# Patient Record
Sex: MALE | Race: WHITE | Employment: FULL TIME | URBAN - METROPOLITAN AREA
[De-identification: names, ages, dates, MRNs, and addresses within clinical notes are randomized per-mention and may not be internally consistent; named-entity substitution may affect disease eponyms.]

---

## 2022-08-25 NOTE — PROGRESS NOTES
Wear loose, comfortable clothing that is easy to put on and take off. They will remain in post-op with the nurse. If you will be returning home the same day as your surgery, you will need to have a responsible adult (25years of age or older) present to drive you home. You will need someone stay with you at home for the first 24 hours following your surgery. This is due to the anesthesia and the medication given to you during surgery and recovery.

## 2022-09-05 ENCOUNTER — ANESTHESIA EVENT (OUTPATIENT)
Dept: OPERATING ROOM | Age: 37
End: 2022-09-05

## 2022-09-08 ENCOUNTER — ANESTHESIA (OUTPATIENT)
Dept: OPERATING ROOM | Age: 37
End: 2022-09-08

## 2022-09-08 ENCOUNTER — HOSPITAL ENCOUNTER (OUTPATIENT)
Age: 37
Setting detail: OUTPATIENT SURGERY
Discharge: HOME | End: 2022-09-08
Attending: PLASTIC SURGERY | Admitting: PLASTIC SURGERY

## 2022-09-08 VITALS
WEIGHT: 232 LBS | RESPIRATION RATE: 17 BRPM | HEIGHT: 68 IN | OXYGEN SATURATION: 98 % | DIASTOLIC BLOOD PRESSURE: 82 MMHG | BODY MASS INDEX: 35.16 KG/M2 | HEART RATE: 77 BPM | SYSTOLIC BLOOD PRESSURE: 132 MMHG | TEMPERATURE: 96.7 F

## 2022-09-08 DIAGNOSIS — G89.18 POST-OP PAIN: Primary | ICD-10-CM

## 2022-09-08 PROCEDURE — 6360000002 HC RX W HCPCS: Performed by: PLASTIC SURGERY

## 2022-09-08 PROCEDURE — 6370000000 HC RX 637 (ALT 250 FOR IP)

## 2022-09-08 PROCEDURE — 2500000003 HC RX 250 WO HCPCS: Performed by: PLASTIC SURGERY

## 2022-09-08 PROCEDURE — 7100000001 HC PACU RECOVERY - ADDTL 15 MIN: Performed by: PLASTIC SURGERY

## 2022-09-08 PROCEDURE — 3600000012 HC SURGERY LEVEL 2 ADDTL 15MIN: Performed by: PLASTIC SURGERY

## 2022-09-08 PROCEDURE — 3600000002 HC SURGERY LEVEL 2 BASE: Performed by: PLASTIC SURGERY

## 2022-09-08 PROCEDURE — 6360000002 HC RX W HCPCS: Performed by: NURSE ANESTHETIST, CERTIFIED REGISTERED

## 2022-09-08 PROCEDURE — 7100000000 HC PACU RECOVERY - FIRST 15 MIN: Performed by: PLASTIC SURGERY

## 2022-09-08 PROCEDURE — 6360000002 HC RX W HCPCS

## 2022-09-08 PROCEDURE — 3700000000 HC ANESTHESIA ATTENDED CARE: Performed by: PLASTIC SURGERY

## 2022-09-08 PROCEDURE — 2500000003 HC RX 250 WO HCPCS: Performed by: NURSE ANESTHETIST, CERTIFIED REGISTERED

## 2022-09-08 PROCEDURE — 3700000001 HC ADD 15 MINUTES (ANESTHESIA): Performed by: PLASTIC SURGERY

## 2022-09-08 PROCEDURE — 2580000003 HC RX 258: Performed by: ANESTHESIOLOGY

## 2022-09-08 PROCEDURE — 2580000003 HC RX 258: Performed by: PLASTIC SURGERY

## 2022-09-08 PROCEDURE — 7100000011 HC PHASE II RECOVERY - ADDTL 15 MIN: Performed by: PLASTIC SURGERY

## 2022-09-08 PROCEDURE — 7100000010 HC PHASE II RECOVERY - FIRST 15 MIN: Performed by: PLASTIC SURGERY

## 2022-09-08 PROCEDURE — 2709999900 HC NON-CHARGEABLE SUPPLY: Performed by: PLASTIC SURGERY

## 2022-09-08 RX ORDER — BUPIVACAINE HYDROCHLORIDE 2.5 MG/ML
INJECTION, SOLUTION INFILTRATION; PERINEURAL
Status: DISCONTINUED
Start: 2022-09-08 | End: 2022-09-08 | Stop reason: HOSPADM

## 2022-09-08 RX ORDER — OXYCODONE HYDROCHLORIDE AND ACETAMINOPHEN 5; 325 MG/1; MG/1
1 TABLET ORAL ONCE
Status: COMPLETED | OUTPATIENT
Start: 2022-09-08 | End: 2022-09-08

## 2022-09-08 RX ORDER — SODIUM CHLORIDE 0.9 % (FLUSH) 0.9 %
5-40 SYRINGE (ML) INJECTION PRN
Status: DISCONTINUED | OUTPATIENT
Start: 2022-09-08 | End: 2022-09-08 | Stop reason: HOSPADM

## 2022-09-08 RX ORDER — ONDANSETRON 2 MG/ML
INJECTION INTRAMUSCULAR; INTRAVENOUS PRN
Status: DISCONTINUED | OUTPATIENT
Start: 2022-09-08 | End: 2022-09-08 | Stop reason: SDUPTHER

## 2022-09-08 RX ORDER — LIDOCAINE HYDROCHLORIDE 10 MG/ML
INJECTION, SOLUTION INFILTRATION; PERINEURAL PRN
Status: DISCONTINUED | OUTPATIENT
Start: 2022-09-08 | End: 2022-09-08 | Stop reason: SDUPTHER

## 2022-09-08 RX ORDER — GLYCOPYRROLATE 0.2 MG/ML
INJECTION INTRAMUSCULAR; INTRAVENOUS PRN
Status: DISCONTINUED | OUTPATIENT
Start: 2022-09-08 | End: 2022-09-08 | Stop reason: SDUPTHER

## 2022-09-08 RX ORDER — ROCURONIUM BROMIDE 10 MG/ML
INJECTION, SOLUTION INTRAVENOUS PRN
Status: DISCONTINUED | OUTPATIENT
Start: 2022-09-08 | End: 2022-09-08 | Stop reason: SDUPTHER

## 2022-09-08 RX ORDER — ONDANSETRON 2 MG/ML
INJECTION INTRAMUSCULAR; INTRAVENOUS
Status: COMPLETED
Start: 2022-09-08 | End: 2022-09-08

## 2022-09-08 RX ORDER — MEPERIDINE HYDROCHLORIDE 50 MG/ML
12.5 INJECTION INTRAMUSCULAR; INTRAVENOUS; SUBCUTANEOUS ONCE
Status: DISCONTINUED | OUTPATIENT
Start: 2022-09-08 | End: 2022-09-08 | Stop reason: HOSPADM

## 2022-09-08 RX ORDER — MIDAZOLAM HYDROCHLORIDE 1 MG/ML
INJECTION INTRAMUSCULAR; INTRAVENOUS PRN
Status: DISCONTINUED | OUTPATIENT
Start: 2022-09-08 | End: 2022-09-08 | Stop reason: SDUPTHER

## 2022-09-08 RX ORDER — FENTANYL CITRATE 50 UG/ML
INJECTION, SOLUTION INTRAMUSCULAR; INTRAVENOUS PRN
Status: DISCONTINUED | OUTPATIENT
Start: 2022-09-08 | End: 2022-09-08 | Stop reason: SDUPTHER

## 2022-09-08 RX ORDER — PROPOFOL 10 MG/ML
INJECTION, EMULSION INTRAVENOUS PRN
Status: DISCONTINUED | OUTPATIENT
Start: 2022-09-08 | End: 2022-09-08 | Stop reason: SDUPTHER

## 2022-09-08 RX ORDER — SODIUM CHLORIDE 9 MG/ML
INJECTION, SOLUTION INTRAVENOUS PRN
Status: DISCONTINUED | OUTPATIENT
Start: 2022-09-08 | End: 2022-09-08 | Stop reason: HOSPADM

## 2022-09-08 RX ORDER — GLYCOPYRROLATE 0.2 MG/ML
0.2 INJECTION INTRAMUSCULAR; INTRAVENOUS ONCE
Status: DISCONTINUED | OUTPATIENT
Start: 2022-09-08 | End: 2022-09-08 | Stop reason: HOSPADM

## 2022-09-08 RX ORDER — ONDANSETRON 2 MG/ML
4 INJECTION INTRAMUSCULAR; INTRAVENOUS
Status: COMPLETED | OUTPATIENT
Start: 2022-09-08 | End: 2022-09-08

## 2022-09-08 RX ORDER — CEPHALEXIN 500 MG/1
500 CAPSULE ORAL 3 TIMES DAILY
Qty: 21 CAPSULE | Refills: 0 | Status: SHIPPED | OUTPATIENT
Start: 2022-09-08 | End: 2022-09-15

## 2022-09-08 RX ORDER — MORPHINE SULFATE 2 MG/ML
1 INJECTION, SOLUTION INTRAMUSCULAR; INTRAVENOUS EVERY 5 MIN PRN
Status: DISCONTINUED | OUTPATIENT
Start: 2022-09-08 | End: 2022-09-08 | Stop reason: HOSPADM

## 2022-09-08 RX ORDER — OXYCODONE HYDROCHLORIDE AND ACETAMINOPHEN 5; 325 MG/1; MG/1
TABLET ORAL
Status: COMPLETED
Start: 2022-09-08 | End: 2022-09-08

## 2022-09-08 RX ORDER — DIPHENHYDRAMINE HYDROCHLORIDE 50 MG/ML
12.5 INJECTION INTRAMUSCULAR; INTRAVENOUS
Status: DISCONTINUED | OUTPATIENT
Start: 2022-09-08 | End: 2022-09-08 | Stop reason: HOSPADM

## 2022-09-08 RX ORDER — SODIUM CHLORIDE 9 MG/ML
25 INJECTION, SOLUTION INTRAVENOUS PRN
Status: DISCONTINUED | OUTPATIENT
Start: 2022-09-08 | End: 2022-09-08 | Stop reason: HOSPADM

## 2022-09-08 RX ORDER — SODIUM CHLORIDE 0.9 % (FLUSH) 0.9 %
5-40 SYRINGE (ML) INJECTION EVERY 12 HOURS SCHEDULED
Status: DISCONTINUED | OUTPATIENT
Start: 2022-09-08 | End: 2022-09-08 | Stop reason: HOSPADM

## 2022-09-08 RX ORDER — SODIUM CHLORIDE, SODIUM LACTATE, POTASSIUM CHLORIDE, CALCIUM CHLORIDE 600; 310; 30; 20 MG/100ML; MG/100ML; MG/100ML; MG/100ML
INJECTION, SOLUTION INTRAVENOUS CONTINUOUS
Status: DISCONTINUED | OUTPATIENT
Start: 2022-09-08 | End: 2022-09-08 | Stop reason: HOSPADM

## 2022-09-08 RX ORDER — NEOSTIGMINE METHYLSULFATE 5 MG/5 ML
SYRINGE (ML) INTRAVENOUS PRN
Status: DISCONTINUED | OUTPATIENT
Start: 2022-09-08 | End: 2022-09-08 | Stop reason: SDUPTHER

## 2022-09-08 RX ORDER — CEFAZOLIN SODIUM 2 G/50ML
SOLUTION INTRAVENOUS PRN
Status: DISCONTINUED | OUTPATIENT
Start: 2022-09-08 | End: 2022-09-08 | Stop reason: SDUPTHER

## 2022-09-08 RX ORDER — CEFAZOLIN 2 G/1
INJECTION, POWDER, FOR SOLUTION INTRAMUSCULAR; INTRAVENOUS
Status: DISCONTINUED
Start: 2022-09-08 | End: 2022-09-08 | Stop reason: HOSPADM

## 2022-09-08 RX ORDER — DEXAMETHASONE SODIUM PHOSPHATE 10 MG/ML
INJECTION, SOLUTION INTRAMUSCULAR; INTRAVENOUS PRN
Status: DISCONTINUED | OUTPATIENT
Start: 2022-09-08 | End: 2022-09-08 | Stop reason: SDUPTHER

## 2022-09-08 RX ORDER — LIDOCAINE HYDROCHLORIDE 10 MG/ML
1 INJECTION, SOLUTION EPIDURAL; INFILTRATION; INTRACAUDAL; PERINEURAL
Status: DISCONTINUED | OUTPATIENT
Start: 2022-09-08 | End: 2022-09-08 | Stop reason: HOSPADM

## 2022-09-08 RX ORDER — OXYCODONE HYDROCHLORIDE AND ACETAMINOPHEN 5; 325 MG/1; MG/1
1 TABLET ORAL EVERY 6 HOURS PRN
Qty: 20 TABLET | Refills: 0 | Status: SHIPPED | OUTPATIENT
Start: 2022-09-08 | End: 2022-09-13

## 2022-09-08 RX ADMIN — FENTANYL CITRATE 50 MCG: 50 INJECTION, SOLUTION INTRAMUSCULAR; INTRAVENOUS at 09:17

## 2022-09-08 RX ADMIN — ROCURONIUM BROMIDE 50 MG: 10 INJECTION, SOLUTION INTRAVENOUS at 07:35

## 2022-09-08 RX ADMIN — MIDAZOLAM 2 MG: 1 INJECTION INTRAMUSCULAR; INTRAVENOUS at 07:30

## 2022-09-08 RX ADMIN — ROCURONIUM BROMIDE 20 MG: 10 INJECTION, SOLUTION INTRAVENOUS at 09:00

## 2022-09-08 RX ADMIN — HYDROMORPHONE HYDROCHLORIDE 0.5 MG: 1 INJECTION, SOLUTION INTRAMUSCULAR; INTRAVENOUS; SUBCUTANEOUS at 10:32

## 2022-09-08 RX ADMIN — ONDANSETRON 4 MG: 2 INJECTION INTRAMUSCULAR; INTRAVENOUS at 10:31

## 2022-09-08 RX ADMIN — GLYCOPYRROLATE 0.6 MG: 0.2 INJECTION INTRAMUSCULAR; INTRAVENOUS at 09:38

## 2022-09-08 RX ADMIN — DEXAMETHASONE SODIUM PHOSPHATE 10 MG: 10 INJECTION INTRAMUSCULAR; INTRAVENOUS at 07:35

## 2022-09-08 RX ADMIN — CEFAZOLIN SODIUM 2000 MG: 2 SOLUTION INTRAVENOUS at 07:45

## 2022-09-08 RX ADMIN — OXYCODONE HYDROCHLORIDE AND ACETAMINOPHEN 1 TABLET: 5; 325 TABLET ORAL at 11:38

## 2022-09-08 RX ADMIN — FENTANYL CITRATE 50 MCG: 50 INJECTION, SOLUTION INTRAMUSCULAR; INTRAVENOUS at 08:37

## 2022-09-08 RX ADMIN — FENTANYL CITRATE 50 MCG: 50 INJECTION, SOLUTION INTRAMUSCULAR; INTRAVENOUS at 08:24

## 2022-09-08 RX ADMIN — PROPOFOL 200 MG: 10 INJECTION, EMULSION INTRAVENOUS at 07:35

## 2022-09-08 RX ADMIN — SODIUM CHLORIDE, POTASSIUM CHLORIDE, SODIUM LACTATE AND CALCIUM CHLORIDE: 600; 310; 30; 20 INJECTION, SOLUTION INTRAVENOUS at 07:30

## 2022-09-08 RX ADMIN — FENTANYL CITRATE 50 MCG: 50 INJECTION, SOLUTION INTRAMUSCULAR; INTRAVENOUS at 08:14

## 2022-09-08 RX ADMIN — ONDANSETRON 4 MG: 2 INJECTION INTRAMUSCULAR; INTRAVENOUS at 09:17

## 2022-09-08 RX ADMIN — FENTANYL CITRATE 50 MCG: 50 INJECTION, SOLUTION INTRAMUSCULAR; INTRAVENOUS at 09:38

## 2022-09-08 RX ADMIN — FENTANYL CITRATE 100 MCG: 50 INJECTION, SOLUTION INTRAMUSCULAR; INTRAVENOUS at 07:35

## 2022-09-08 RX ADMIN — Medication 3 MG: at 09:38

## 2022-09-08 RX ADMIN — Medication 0.5 MG: at 10:32

## 2022-09-08 RX ADMIN — LIDOCAINE HYDROCHLORIDE 40 MG: 10 INJECTION, SOLUTION INFILTRATION; PERINEURAL at 07:35

## 2022-09-08 ASSESSMENT — PAIN SCALES - GENERAL
PAINLEVEL_OUTOF10: 5
PAINLEVEL_OUTOF10: 0
PAINLEVEL_OUTOF10: 6
PAINLEVEL_OUTOF10: 0
PAINLEVEL_OUTOF10: 6
PAINLEVEL_OUTOF10: 0
PAINLEVEL_OUTOF10: 6

## 2022-09-08 ASSESSMENT — PAIN DESCRIPTION - DESCRIPTORS
DESCRIPTORS: BURNING
DESCRIPTORS: BURNING

## 2022-09-08 ASSESSMENT — PAIN DESCRIPTION - LOCATION
LOCATION: CHEST
LOCATION: CHEST;ABDOMEN

## 2022-09-08 ASSESSMENT — PAIN DESCRIPTION - ORIENTATION
ORIENTATION: LOWER
ORIENTATION: RIGHT

## 2022-09-08 ASSESSMENT — PAIN - FUNCTIONAL ASSESSMENT: PAIN_FUNCTIONAL_ASSESSMENT: 0-10

## 2022-09-08 NOTE — H&P
Temp 98.1 °F (36.7 °C) (Infrared)   Resp 18   Ht 5' 8\" (1.727 m)   Wt 232 lb (105.2 kg)   SpO2 99%   BMI 35.28 kg/m²     Physical Exam  Vitals and nursing note reviewed. Constitutional:       General: He is not in acute distress. Appearance: He is well-developed. He is not diaphoretic. HENT:      Head: Normocephalic and atraumatic. Mouth/Throat:      Pharynx: No oropharyngeal exudate. Eyes:      General: No scleral icterus. Right eye: No discharge. Left eye: No discharge. Conjunctiva/sclera: Conjunctivae normal.      Pupils: Pupils are equal, round, and reactive to light. Neck:      Thyroid: No thyromegaly. Vascular: No JVD. Trachea: No tracheal deviation. Cardiovascular:      Rate and Rhythm: Normal rate and regular rhythm. Pulmonary:      Effort: Pulmonary effort is normal. No respiratory distress. Breath sounds: No stridor. No wheezing. Abdominal:      General: There is no distension. Palpations: Abdomen is soft. There is no mass. Tenderness: There is no abdominal tenderness. There is no guarding or rebound. Comments: Abdominal wall lipodystrophy. Lipodystrophy of the flanks and lower back. Musculoskeletal:         General: Normal range of motion. Cervical back: Normal range of motion. Skin:     General: Skin is warm and dry. Findings: No erythema or rash. Neurological:      General: No focal deficit present. Mental Status: He is alert and oriented to person, place, and time. Cranial Nerves: No cranial nerve deficit. Psychiatric:         Behavior: Behavior normal.         Thought Content: Thought content normal.         Judgment: Judgment normal.       Labs    No results found for this or any previous visit (from the past 24 hour(s)). Imaging/Diagnostics Last 24 Hours   No results found. Assessment      Lipodystrophy of the chest, abdomen, flanks and lower back.     Plan   Liposuction of the abdomen, flanks chest and lower back.   Patient understands the risks involved including but not limited to the risk of infection, bleeding, scar formation, contour irregularity such as dimpling and depression, skin laxity, the rare risk of pulmonary embolus, and need for reoperation and wished to proceed    Consultations Ordered:  None    Electronically signed by Krystal Liang MD on 9/8/22 at 7:28 AM EDT

## 2022-09-08 NOTE — ANESTHESIA POSTPROCEDURE EVALUATION
POST- ANESTHESIA EVALUATION       Pt Name: Rhett Chen  MRN: [id number]  [place]: 1985  Date of evaluation: 9/8/2022  Time:  12:10 PM      /82   Pulse 77   Temp (!) 96.7 °F (35.9 °C) (Temporal)   Resp 17   Ht 5' 8\" (1.727 m)   Wt 232 lb (105.2 kg)   SpO2 98%   BMI 35.28 kg/m²      Consciousness Level  Awake  Cardiopulmonary Status  Stable  Pain Adequately Treated YES  Nausea / Vomiting  NO  Adequate Hydration  YES  Anesthesia Related Complications NONE      Electronically signed by Janalee Hamman, MD on 9/8/2022 at 12:10 PM       Department of Anesthesiology  Postprocedure Note    Patient: Rhett Chen  MRN: [id number]  [place]: 1985  Date of evaluation: 9/8/2022      Procedure Summary     Date: 09/08/22 Room / Location: [address]    Anesthesia Start: 0730 Anesthesia Stop: 1003    Procedure: COSMETIC CHEST, ABDOMEN, AND BILATERAL FLANKS, AND LOWER BACK RECIPROCATING  LIPECTOMY SUCTION (Abdomen) Diagnosis:       Encounter for cosmetic surgery      (Encounter for cosmetic surgery [Z41.1])    Surgeons: Lennon Galeazzi, MD Responsible Provider: Janalee Hamman, MD    Anesthesia Type: general ASA Status: 2          Anesthesia Type: No value filed.     Ezra Phase I: Ezra Score: 10    Ezra Phase II: Ezra Score: 10      Anesthesia Post Evaluation

## 2022-09-08 NOTE — BRIEF OP NOTE
Brief Postoperative Note      Patient: Ly Lees  YOB: 1985  MRN: [id number]    Date of Procedure: 9/8/2022    Pre-Op Diagnosis: Encounter for cosmetic surgery [Z41.1]    Post-Op Diagnosis: Same       Procedure(s):  COSMETIC CHEST, ABDOMEN, AND BILATERAL FLANKS, AND LOWER BACK RECIPROCATING  LIPECTOMY SUCTION    Surgeon(s): Annelise Whitfield MD    Assistant:  Resident: Purnima Nye DPM    Anesthesia: General    Estimated Blood Loss (mL): 892     Complications: None    Specimens:   * No specimens in log *    Implants:  * No implants in log *      Drains: * No LDAs found *    Findings: See full operative report.     Electronically signed by Purnima Nye DPM on 9/8/2022 at 9:56 AM

## 2022-09-08 NOTE — OP NOTE
Operative Note      Patient: Nancy Buckley  YOB: 1985  MRN: [id number]    Date of Procedure: 9/8/2022    Pre-Op Diagnosis: Encounter for cosmetic surgery [Z41.1]    Post-Op Diagnosis: Same       Procedure(s):  COSMETIC CHEST, ABDOMEN, AND BILATERAL FLANKS, AND LOWER BACK RECIPROCATING  LIPECTOMY SUCTION    Surgeon(s): Danny Antoine MD    Assistant:   Resident: Ajay Arita DPM    Anesthesia: General    Estimated Blood Loss (mL): less than 140 ml    Complications: None    Specimens:   * No specimens in log *    Implants:  * No implants in log *      Drains: * No LDAs found *    Findings: total of 3800 ml yellow fatty aspirate. 2500 ml of tumescent fluid. Detailed Description of Procedure: The patient was brought into the operating room and placed under general anesthesia. His trunk and chest were prepped and draped in sterile fashion. Multiple small stab wounds were made in the lower abdomen as well as chest region and tumescent fluid instilled. Liposuction using the reciprocating liposuction was undertaken with clear yellow fatty aspirate. The puncture wounds were closed with 4-0 Monocryl deep dermal layer and Dermabond with Steri-Strips on the skin. The patient was then rolled onto the prone position and tumescent fluid instilled in his lower back. Liposuction was undertaken with clear yellow fatty aspirate. The incisions were closed with 4-0 Monocryl deep dermal layer and Steri-Strips applied. The patient tolerated the procedure well and was taken postop recovery in stable condition.     Electronically signed by Danny Antoine MD on 9/8/2022 at 9:51 AM

## 2022-09-08 NOTE — ANESTHESIA PRE PROCEDURE
Department of Anesthesiology  Preprocedure Note       Name:  Virgen Medellin   Age:  39 y.o.  :  1985                                          MRN:  [id number]         Date:  2022      Surgeon: London Katz): Brandon Marie MD    Procedure: Procedure(s):  COSMETIC CHEST, ABDOMEN, AND BILATERAL FLANKS, AND LOWER BACK RECIPROCATING  LIPECTOMY SUCTION    Medications prior to admission:   Prior to Admission medications    Medication Sig Start Date End Date Taking? Authorizing Provider   Dexlansoprazole (DEXILANT PO) Take 60 mg by mouth daily   Yes Historical Provider, MD   traZODone (DESYREL) 50 MG tablet Take 50 mg by mouth nightly    Historical Provider, MD   cyclobenzaprine (FLEXERIL) 10 MG tablet Take 10 mg by mouth in the morning. Historical Provider, MD   BUPROPION HBR ER PO Take by mouth daily    Historical Provider, MD   Naproxen Sodium (ALEVE PO) Take by mouth    Historical Provider, MD       Current medications:    Current Facility-Administered Medications   Medication Dose Route Frequency Provider Last Rate Last Admin    lidocaine PF 1 % injection 1 mL  1 mL IntraDERmal Once PRN Violet Nichols MD        lactated ringers infusion   IntraVENous Continuous Violet Nichols MD        sodium chloride flush 0.9 % injection 5-40 mL  5-40 mL IntraVENous 2 times per day Violet Nichols MD        sodium chloride flush 0.9 % injection 5-40 mL  5-40 mL IntraVENous PRN Violet Nichols MD        0.9 % sodium chloride infusion   IntraVENous PRN Violet Nichols MD        bupivacaine (MARCAINE) 0.25 % injection             EPINEPHrine 1 MG/ML injection             bupivacaine (MARCAINE) 0.25 % injection             EPINEPHrine 1 MG/ML injection             ceFAZolin (ANCEF) 2 g injection                Allergies:  No Known Allergies    Problem List:  There is no problem list on file for this patient. Past Medical History:  History reviewed. No pertinent past medical history.     Past Surgical History:        Procedure Laterality Date    APPENDECTOMY      LIPOSUCTION  12/15/2016    Bilateral gynecomastia, abdominal, and verenice flanks       Social History:    Social History     Tobacco Use    Smoking status: Former     Types: Cigarettes     Quit date: 2017     Years since quittin.6    Smokeless tobacco: Current    Tobacco comments:     vape   Substance Use Topics    Alcohol use: Yes     Comment: social                                Ready to quit: Not Answered  Counseling given: Not Answered  Tobacco comments: vape      Vital Signs (Current):   Vitals:    22 1157 22 0628   BP:  136/85   Pulse:  81   Resp:  18   Temp:  98.1 °F (36.7 °C)   TempSrc:  Infrared   SpO2:  99%   Weight: 220 lb (99.8 kg) 232 lb (105.2 kg)   Height: 5' 8\" (1.727 m) 5' 8\" (1.727 m)                                              BP Readings from Last 3 Encounters:   22 136/85   22 130/74   17 121/81       NPO Status: Time of last liquid consumption: 1800                        Time of last solid consumption: 1800                        Date of last liquid consumption: 22                        Date of last solid food consumption: 22    BMI:   Wt Readings from Last 3 Encounters:   22 232 lb (105.2 kg)   22 208 lb (94.3 kg)   17 190 lb (86.2 kg)     Body mass index is 35.28 kg/m².     CBC:   Lab Results   Component Value Date/Time    WBC 8.8 2012 09:42 AM    RBC 4.44 2012 09:42 AM    HGB 13.9 2012 09:42 AM    HCT 40.3 2012 09:42 AM    MCV 90.9 2012 09:42 AM    RDW 13.6 2012 09:42 AM     2012 09:42 AM       CMP:   Lab Results   Component Value Date/Time     2012 09:42 AM    K 3.6 2012 09:42 AM     2012 09:42 AM    CO2 30 2012 09:42 AM    BUN 13 2012 09:42 AM    CREATININE 1.06 2012 09:42 AM    GFRAA >60 2012 09:42 AM    LABGLOM >60 2012 09:42 AM GLUCOSE 92 07/14/2012 09:42 AM    CALCIUM 9.4 07/14/2012 09:42 AM    BILITOT 0.57 07/14/2012 09:42 AM    ALKPHOS 88 07/14/2012 09:42 AM    AST 59 07/14/2012 09:42 AM    ALT 30 07/14/2012 09:42 AM       POC Tests: No results for input(s): POCGLU, POCNA, POCK, POCCL, POCBUN, POCHEMO, POCHCT in the last 72 hours. Coags: No results found for: PROTIME, INR, APTT    HCG (If Applicable): No results found for: PREGTESTUR, PREGSERUM, HCG, HCGQUANT     ABGs: No results found for: PHART, PO2ART, RQB6ACU, WGG8WWE, BEART, C8IQHAVA     Type & Screen (If Applicable):  No results found for: LABABO, LABRH    Drug/Infectious Status (If Applicable):  No results found for: HIV, HEPCAB    COVID-19 Screening (If Applicable): No results found for: COVID19        Anesthesia Evaluation  Patient summary reviewed and Nursing notes reviewed no history of anesthetic complications:   Airway: Mallampati: II  TM distance: >3 FB   Neck ROM: full  Mouth opening: > = 3 FB   Dental: normal exam         Pulmonary:Negative Pulmonary ROS and normal exam  breath sounds clear to auscultation                             Cardiovascular:Negative CV ROS            Rhythm: regular  Rate: normal                    Neuro/Psych:   Negative Neuro/Psych ROS              GI/Hepatic/Renal: Neg GI/Hepatic/Renal ROS           ROS comment: Severe obesity. Endo/Other: Negative Endo/Other ROS                    Abdominal:       Abdomen: soft. Vascular: negative vascular ROS. Other Findings:           Anesthesia Plan      general     ASA 2       Induction: intravenous. Anesthetic plan and risks discussed with patient. Plan discussed with CRNA.                     Rivera York MD   9/8/2022

## 2023-04-24 ENCOUNTER — HOSPITAL ENCOUNTER (OUTPATIENT)
Age: 38
Discharge: HOME | End: 2023-04-24
Payer: COMMERCIAL

## 2023-04-24 LAB
ABSOLUTE EOS #: 0.19 K/UL (ref 0–0.44)
ABSOLUTE IMMATURE GRANULOCYTE: <0.03 K/UL (ref 0–0.3)
ABSOLUTE LYMPH #: 1.88 K/UL (ref 1.1–3.7)
ABSOLUTE MONO #: 0.54 K/UL (ref 0.1–1.2)
ALBUMIN SERPL-MCNC: 4.7 G/DL (ref 3.5–5.2)
ALBUMIN/GLOBULIN RATIO: 2 (ref 1–2.5)
ALP SERPL-CCNC: 86 U/L (ref 40–129)
ALT SERPL-CCNC: 31 U/L (ref 5–41)
ANION GAP SERPL CALCULATED.3IONS-SCNC: 10 MMOL/L (ref 9–17)
AST SERPL-CCNC: 27 U/L
BASOPHILS # BLD: 2 % (ref 0–2)
BASOPHILS ABSOLUTE: 0.1 K/UL (ref 0–0.2)
BILIRUB SERPL-MCNC: 0.3 MG/DL (ref 0.3–1.2)
BUN SERPL-MCNC: 16 MG/DL (ref 6–20)
CALCIUM SERPL-MCNC: 9.6 MG/DL (ref 8.6–10.4)
CHLORIDE SERPL-SCNC: 102 MMOL/L (ref 98–107)
CHOLEST SERPL-MCNC: 206 MG/DL
CHOLESTEROL/HDL RATIO: 4.8
CO2 SERPL-SCNC: 26 MMOL/L (ref 20–31)
CREAT SERPL-MCNC: 1.16 MG/DL (ref 0.7–1.2)
EOSINOPHILS RELATIVE PERCENT: 4 % (ref 1–4)
GFR SERPL CREATININE-BSD FRML MDRD: >60 ML/MIN/1.73M2
GLUCOSE SERPL-MCNC: 92 MG/DL (ref 70–99)
HCT VFR BLD AUTO: 45.7 % (ref 40.7–50.3)
HDLC SERPL-MCNC: 43 MG/DL
HGB BLD-MCNC: 15.2 G/DL (ref 13–17)
IMMATURE GRANULOCYTES: 0 %
LDLC SERPL CALC-MCNC: 137 MG/DL (ref 0–130)
LYMPHOCYTES # BLD: 38 % (ref 24–43)
MCH RBC QN AUTO: 29.7 PG (ref 25.2–33.5)
MCHC RBC AUTO-ENTMCNC: 33.3 G/DL (ref 28.4–34.8)
MCV RBC AUTO: 89.4 FL (ref 82.6–102.9)
MONOCYTES # BLD: 11 % (ref 3–12)
NRBC AUTOMATED: 0 PER 100 WBC
PDW BLD-RTO: 12.1 % (ref 11.8–14.4)
PLATELET # BLD AUTO: 344 K/UL (ref 138–453)
PMV BLD AUTO: 10.4 FL (ref 8.1–13.5)
POTASSIUM SERPL-SCNC: 4 MMOL/L (ref 3.7–5.3)
PROT SERPL-MCNC: 7.1 G/DL (ref 6.4–8.3)
RBC # BLD: 5.11 M/UL (ref 4.21–5.77)
SEG NEUTROPHILS: 45 % (ref 36–65)
SEGMENTED NEUTROPHILS ABSOLUTE COUNT: 2.28 K/UL (ref 1.5–8.1)
SODIUM SERPL-SCNC: 138 MMOL/L (ref 135–144)
TRIGL SERPL-MCNC: 128 MG/DL
TSH SERPL-ACNC: 2.72 UIU/ML (ref 0.3–5)
WBC # BLD AUTO: 5 K/UL (ref 3.5–11.3)

## 2023-04-24 PROCEDURE — 85025 COMPLETE CBC W/AUTO DIFF WBC: CPT

## 2023-04-24 PROCEDURE — 84443 ASSAY THYROID STIM HORMONE: CPT

## 2023-04-24 PROCEDURE — 36415 COLL VENOUS BLD VENIPUNCTURE: CPT

## 2023-04-24 PROCEDURE — 80053 COMPREHEN METABOLIC PANEL: CPT

## 2023-04-24 PROCEDURE — 80061 LIPID PANEL: CPT

## 2023-06-28 ENCOUNTER — OFFICE VISIT (OUTPATIENT)
Dept: PODIATRY | Age: 38
End: 2023-06-28
Payer: COMMERCIAL

## 2023-06-28 VITALS
SYSTOLIC BLOOD PRESSURE: 119 MMHG | BODY MASS INDEX: 33.34 KG/M2 | HEIGHT: 68 IN | HEART RATE: 73 BPM | WEIGHT: 220 LBS | DIASTOLIC BLOOD PRESSURE: 79 MMHG

## 2023-06-28 DIAGNOSIS — B35.3 TINEA PEDIS OF LEFT FOOT: Primary | ICD-10-CM

## 2023-06-28 DIAGNOSIS — M79.672 LEFT FOOT PAIN: ICD-10-CM

## 2023-06-28 PROCEDURE — 99203 OFFICE O/P NEW LOW 30 MIN: CPT

## 2023-06-28 PROCEDURE — 99203 OFFICE O/P NEW LOW 30 MIN: CPT | Performed by: PODIATRIST

## 2023-06-28 RX ORDER — ROSUVASTATIN CALCIUM 5 MG/1
TABLET, COATED ORAL
COMMUNITY
Start: 2023-05-29

## 2023-06-28 RX ORDER — KETOCONAZOLE 20 MG/G
CREAM TOPICAL
Qty: 30 G | Refills: 2 | Status: SHIPPED | OUTPATIENT
Start: 2023-06-28

## 2023-06-28 RX ORDER — LORATADINE/PSEUDOEPHEDRINE 10MG-240MG
TABLET, EXTENDED RELEASE 24 HR ORAL
COMMUNITY
Start: 2023-06-09

## 2023-09-11 ENCOUNTER — HOSPITAL ENCOUNTER (OUTPATIENT)
Dept: MRI IMAGING | Age: 38
Discharge: HOME | End: 2023-09-13
Payer: COMMERCIAL

## 2023-09-11 DIAGNOSIS — M54.59 WEIGHT LIFTER'S BACK: ICD-10-CM

## 2023-09-11 DIAGNOSIS — M25.551 RIGHT HIP PAIN: ICD-10-CM

## 2023-09-11 PROCEDURE — 72148 MRI LUMBAR SPINE W/O DYE: CPT

## 2023-09-11 PROCEDURE — 73721 MRI JNT OF LWR EXTRE W/O DYE: CPT

## 2024-05-17 NOTE — PROGRESS NOTES
Patient instructed to remove shoes and socks and instructed to sit in exam chair.  Current PCP is Ciaran Jones DO and date of last visit was unknown.   Do you have a follow up visit scheduled?  No  If yes, the date is

## 2024-05-20 ENCOUNTER — OFFICE VISIT (OUTPATIENT)
Dept: PODIATRY | Age: 39
End: 2024-05-20
Payer: COMMERCIAL

## 2024-05-20 VITALS
HEIGHT: 68 IN | HEART RATE: 72 BPM | DIASTOLIC BLOOD PRESSURE: 82 MMHG | WEIGHT: 210 LBS | SYSTOLIC BLOOD PRESSURE: 128 MMHG | BODY MASS INDEX: 31.83 KG/M2

## 2024-05-20 DIAGNOSIS — M79.672 LEFT FOOT PAIN: ICD-10-CM

## 2024-05-20 DIAGNOSIS — L60.0 IGTN (INGROWING TOE NAIL): Primary | ICD-10-CM

## 2024-05-20 DIAGNOSIS — M79.671 RIGHT FOOT PAIN: ICD-10-CM

## 2024-05-20 PROCEDURE — 11750 EXCISION NAIL&NAIL MATRIX: CPT

## 2024-05-20 PROCEDURE — 99213 OFFICE O/P EST LOW 20 MIN: CPT | Performed by: PODIATRIST

## 2024-05-20 PROCEDURE — 99214 OFFICE O/P EST MOD 30 MIN: CPT

## 2024-05-20 RX ORDER — LIDOCAINE HYDROCHLORIDE 10 MG/ML
6 INJECTION, SOLUTION INFILTRATION; PERINEURAL ONCE
Status: COMPLETED | OUTPATIENT
Start: 2024-05-20 | End: 2024-05-20

## 2024-05-20 RX ORDER — DOXYCYCLINE HYCLATE 100 MG
100 TABLET ORAL 2 TIMES DAILY
Qty: 28 TABLET | Refills: 0 | Status: SHIPPED | OUTPATIENT
Start: 2024-05-20 | End: 2024-06-03

## 2024-05-20 RX ADMIN — LIDOCAINE HYDROCHLORIDE 6 ML: 10 INJECTION, SOLUTION INFILTRATION; PERINEURAL at 16:00

## 2024-05-20 RX ADMIN — LIDOCAINE HYDROCHLORIDE 6 ML: 10 INJECTION, SOLUTION INFILTRATION; PERINEURAL at 16:02

## 2024-05-20 NOTE — PROGRESS NOTES
[facility]  [address]   [address] [place] [address]  Tel: [phone]   Fax: [phone]    Subjective     CC: Bilateral hallux pain    HPI:  Edgardo Castle is a 38 y.o. year old male who presents to clinic today complaining of bilateral hallux pain. Patient states that pain has been present for roughly 4 months. Admits to increased pain with pressure to the area.  No drainage recently from the area however has noticed erythema.  Admits to history of ingrown toenail in the past.  States that his left foot is worse than his right.  Denies any nausea, vomiting, fever, chills, shortness of breath, chest pain or calf pain noted.     Primary care physician is Ciaran Jones DO.    ROS:    Constitutional: Denies nausea, vomiting, fever, chills.  Neurologic: Denies numbness, tingling, and burning in the feet.    Vascular: Denies symptoms of lower extremity claudication.    Skin: Denies open wounds.  Otherwise negative except as noted in the HPI.     PMH:  No past medical history on file.    Surgical History:   Past Surgical History:   Procedure Laterality Date    ABDOMEN SURGERY N/A 2022    COSMETIC CHEST, ABDOMEN, AND BILATERAL FLANKS, AND LOWER BACK RECIPROCATING  LIPECTOMY SUCTION performed by EDEL Logan MD at [facility] OR    APPENDECTOMY      LIPOSUCTION  12/15/2016    Bilateral gynecomastia, abdominal, and verenice flanks    LIPOSUCTION N/A 2022    COSMETIC CHEST, ABDOMEN, AND BILATERAL FLANKS, AND LOWER BACK RECIPROCATING  LIPECTOMY SUCTION (Abdomen)       Social History:  Social History     Tobacco Use    Smoking status: Former     Current packs/day: 0.00     Types: Cigarettes     Quit date: 2017     Years since quittin.3    Smokeless tobacco: Current    Tobacco comments:     vape   Vaping Use    Vaping Use: Every day   Substance Use Topics    Alcohol use: Yes     Comment: social    Drug use: No       Medications:  Prior to Admission medications    Medication

## 2024-06-03 ENCOUNTER — OFFICE VISIT (OUTPATIENT)
Dept: PODIATRY | Age: 39
End: 2024-06-03
Payer: COMMERCIAL

## 2024-06-03 VITALS
SYSTOLIC BLOOD PRESSURE: 114 MMHG | WEIGHT: 210 LBS | BODY MASS INDEX: 31.83 KG/M2 | HEIGHT: 68 IN | DIASTOLIC BLOOD PRESSURE: 80 MMHG | HEART RATE: 76 BPM

## 2024-06-03 DIAGNOSIS — L60.0 INGROWING NAIL, LEFT GREAT TOE: ICD-10-CM

## 2024-06-03 DIAGNOSIS — S91.209S: Primary | ICD-10-CM

## 2024-06-03 DIAGNOSIS — L60.0 INGROWING NAIL, RIGHT GREAT TOE: ICD-10-CM

## 2024-06-03 PROCEDURE — 99213 OFFICE O/P EST LOW 20 MIN: CPT | Performed by: PODIATRIST

## 2024-06-03 PROCEDURE — 99213 OFFICE O/P EST LOW 20 MIN: CPT

## 2024-06-03 NOTE — PROGRESS NOTES
Patient instructed to remove shoes and socks and instructed to sit in exam chair.  Current PCP is Ciaran Jones DO and date of last visit was unknown.   Do you have a follow up visit scheduled?  No  If yes, the date is     
LIPOSUCTION N/A 2022    COSMETIC CHEST, ABDOMEN, AND BILATERAL FLANKS, AND LOWER BACK RECIPROCATING  LIPECTOMY SUCTION (Abdomen)       Social History:  Social History     Tobacco Use    Smoking status: Former     Current packs/day: 0.00     Types: Cigarettes     Quit date: 2017     Years since quittin.4    Smokeless tobacco: Current    Tobacco comments:     vape   Vaping Use    Vaping Use: Every day   Substance Use Topics    Alcohol use: Yes     Comment: social    Drug use: No       Medications:  Prior to Admission medications    Medication Sig Start Date End Date Taking? Authorizing Provider   rosuvastatin (CRESTOR) 5 MG tablet  23  Yes Anay Cox MD   LORATADINE-D 24HR  MG per extended release tablet  23  Yes Anay Cox MD   ketoconazole (NIZORAL) 2 % cream Apply topically as needed. Please alternate with the aquphor to bilateral feet 23  Yes Jean Paul Pascal DPM   Dexlansoprazole (DEXILANT PO) Take 60 mg by mouth daily   Yes Anay Cox MD   traZODone (DESYREL) 50 MG tablet Take 1 tablet by mouth nightly   Yes Anay Cox MD   cyclobenzaprine (FLEXERIL) 10 MG tablet Take 1 tablet by mouth daily   Yes Anay Cox MD   BUPROPION HBR ER PO Take by mouth daily   Yes Anay Cox MD   Naproxen Sodium (ALEVE PO) Take by mouth   Yes Anay Cox MD   mineral oil-hydrophilic petrolatum (AQUAPHOR) ointment Apply topically as needed. Please alternate with the ketoconazole to bilateral feet 23   Jean Paul Pascal DPM       Objective     Vitals:    24 1537   BP: 114/80   Pulse: 76       No results found for: \"LABA1C\"    Physical Exam:  General:  Alert and oriented x3. In no acute distress.     Lower Extremity Physical Exam:    Vascular: DP and PT pulses are palpable, Bilateral. CFT <3 seconds to all digits, Bilateral.  Positive edema noted to the bilateral hallux.     Neuro: Saph/sural/SP/DP/plantar sensation and

## 2024-06-21 NOTE — PROGRESS NOTES
Patient instructed to remove shoes and socks and instructed to sit in exam chair.  Current PCP is Ciaran Jones DO and date of last visit was unknown.   Do you have a follow up visit scheduled?  Yes  If yes, the date is

## 2024-06-24 ENCOUNTER — OFFICE VISIT (OUTPATIENT)
Dept: PODIATRY | Age: 39
End: 2024-06-24
Payer: COMMERCIAL

## 2024-06-24 VITALS
SYSTOLIC BLOOD PRESSURE: 113 MMHG | BODY MASS INDEX: 31.83 KG/M2 | WEIGHT: 210 LBS | DIASTOLIC BLOOD PRESSURE: 76 MMHG | HEART RATE: 64 BPM | HEIGHT: 68 IN

## 2024-06-24 DIAGNOSIS — L60.0 IGTN (INGROWING TOE NAIL): Primary | ICD-10-CM

## 2024-06-24 PROCEDURE — 99212 OFFICE O/P EST SF 10 MIN: CPT

## 2024-06-24 PROCEDURE — 99213 OFFICE O/P EST LOW 20 MIN: CPT | Performed by: PODIATRIST

## 2024-06-24 NOTE — PROGRESS NOTES
[facility]  [address]   [address] [place] [address]  Tel: [phone]   Fax: [phone]    Subjective     CC: Bilateral hallux pain    Interval history:  Patient returns to clinic for follow-up of bilateral medial and lateral border nail avulsions.  Patient reports that he has been recovering well and has been continuing to dress the areas with Band-Aid. Patient denies any increased redness, drainage, or edema to the areas. Patient denies any other complaints at this time.    HPI:  Edgardo Castle is a 38 y.o. year old male who presents to clinic today complaining of bilateral hallux pain. Patient states that pain has been present for roughly 4 months. Admits to increased pain with pressure to the area.  No drainage recently from the area however has noticed erythema.  Admits to history of ingrown toenail in the past.  States that his left foot is worse than his right.  Denies any nausea, vomiting, fever, chills, shortness of breath, chest pain or calf pain noted.     Primary care physician is Ciaran Jones DO.    ROS:    Constitutional: Denies nausea, vomiting, fever, chills.  Neurologic: Denies numbness, tingling, and burning in the feet.    Vascular: Denies symptoms of lower extremity claudication.    Skin: Denies open wounds.  Otherwise negative except as noted in the HPI.     PMH:  No past medical history on file.    Surgical History:   Past Surgical History:   Procedure Laterality Date    ABDOMEN SURGERY N/A 9/8/2022    COSMETIC CHEST, ABDOMEN, AND BILATERAL FLANKS, AND LOWER BACK RECIPROCATING  LIPECTOMY SUCTION performed by EDEL Logan MD at [facility] OR    APPENDECTOMY      LIPOSUCTION  12/15/2016    Bilateral gynecomastia, abdominal, and verenice flanks    LIPOSUCTION N/A 09/08/2022    COSMETIC CHEST, ABDOMEN, AND BILATERAL FLANKS, AND LOWER BACK RECIPROCATING  LIPECTOMY SUCTION (Abdomen)       Social History:  Social History     Tobacco Use    Smoking status: Former

## 2025-02-03 RX ORDER — MUPIROCIN 20 MG/G
OINTMENT TOPICAL
Qty: 22 G | OUTPATIENT
Start: 2025-02-03

## 2025-04-24 ENCOUNTER — HOSPITAL ENCOUNTER (OUTPATIENT)
Age: 40
Discharge: HOME | End: 2025-04-24
Payer: COMMERCIAL

## 2025-04-24 LAB
ALBUMIN SERPL-MCNC: 4.4 G/DL (ref 3.5–5.2)
ALBUMIN/GLOB SERPL: 1.8 {RATIO} (ref 1–2.5)
ALP SERPL-CCNC: 101 U/L (ref 40–129)
ALT SERPL-CCNC: 41 U/L (ref 10–50)
ANION GAP SERPL CALCULATED.3IONS-SCNC: 8 MMOL/L (ref 9–16)
AST SERPL-CCNC: 47 U/L (ref 10–50)
BASOPHILS # BLD: 0.1 K/UL (ref 0–0.2)
BASOPHILS NFR BLD: 2 % (ref 0–2)
BILIRUB SERPL-MCNC: 0.3 MG/DL (ref 0–1.2)
BUN SERPL-MCNC: 21 MG/DL (ref 6–20)
CALCIUM SERPL-MCNC: 9.4 MG/DL (ref 8.6–10.4)
CHLORIDE SERPL-SCNC: 103 MMOL/L (ref 98–107)
CHOLEST SERPL-MCNC: 141 MG/DL (ref 0–199)
CHOLESTEROL/HDL RATIO: 3.1
CO2 SERPL-SCNC: 28 MMOL/L (ref 20–31)
CREAT SERPL-MCNC: 1.4 MG/DL (ref 0.7–1.2)
EOSINOPHIL # BLD: 0.2 K/UL (ref 0–0.44)
EOSINOPHILS RELATIVE PERCENT: 3 % (ref 1–4)
ERYTHROCYTE [DISTWIDTH] IN BLOOD BY AUTOMATED COUNT: 12 % (ref 11.8–14.4)
GFR, ESTIMATED: 66 ML/MIN/1.73M2
GLUCOSE SERPL-MCNC: 93 MG/DL (ref 74–99)
HCT VFR BLD AUTO: 44.1 % (ref 40.7–50.3)
HDLC SERPL-MCNC: 45 MG/DL
HGB BLD-MCNC: 14.2 G/DL (ref 13–17)
IMM GRANULOCYTES # BLD AUTO: <0.03 K/UL (ref 0–0.3)
IMM GRANULOCYTES NFR BLD: 0 %
LDLC SERPL CALC-MCNC: 72 MG/DL (ref 0–100)
LYMPHOCYTES NFR BLD: 2.3 K/UL (ref 1.1–3.7)
LYMPHOCYTES RELATIVE PERCENT: 38 % (ref 24–43)
MCH RBC QN AUTO: 27.7 PG (ref 25.2–33.5)
MCHC RBC AUTO-ENTMCNC: 32.2 G/DL (ref 28.4–34.8)
MCV RBC AUTO: 86 FL (ref 82.6–102.9)
MONOCYTES NFR BLD: 0.81 K/UL (ref 0.1–1.2)
MONOCYTES NFR BLD: 14 % (ref 3–12)
NEUTROPHILS NFR BLD: 43 % (ref 36–65)
NEUTS SEG NFR BLD: 2.57 K/UL (ref 1.5–8.1)
NRBC BLD-RTO: 0 PER 100 WBC
PLATELET # BLD AUTO: 355 K/UL (ref 138–453)
PMV BLD AUTO: 10.3 FL (ref 8.1–13.5)
POTASSIUM SERPL-SCNC: 4.4 MMOL/L (ref 3.7–5.3)
PROT SERPL-MCNC: 6.8 G/DL (ref 6.6–8.7)
RBC # BLD AUTO: 5.13 M/UL (ref 4.21–5.77)
SODIUM SERPL-SCNC: 139 MMOL/L (ref 136–145)
TRIGL SERPL-MCNC: 118 MG/DL
TSH SERPL DL<=0.05 MIU/L-ACNC: 2.53 UIU/ML (ref 0.27–4.2)
VLDLC SERPL CALC-MCNC: 24 MG/DL (ref 1–30)
WBC OTHER # BLD: 6 K/UL (ref 3.5–11.3)

## 2025-04-24 PROCEDURE — 80053 COMPREHEN METABOLIC PANEL: CPT

## 2025-04-24 PROCEDURE — 84443 ASSAY THYROID STIM HORMONE: CPT

## 2025-04-24 PROCEDURE — 80061 LIPID PANEL: CPT

## 2025-04-24 PROCEDURE — 36415 COLL VENOUS BLD VENIPUNCTURE: CPT

## 2025-04-24 PROCEDURE — 85025 COMPLETE CBC W/AUTO DIFF WBC: CPT

## 2025-05-06 DIAGNOSIS — B35.3 TINEA PEDIS OF LEFT FOOT: ICD-10-CM

## (undated) DEVICE — DRAPE,T,LAPARO,TRANS,STERILE: Brand: MEDLINE

## (undated) DEVICE — STERILE POLYISOPRENE POWDER-FREE SURGICAL GLOVES: Brand: PROTEXIS

## (undated) DEVICE — DRAPE,REIN 53X77,STERILE: Brand: MEDLINE

## (undated) DEVICE — SOLUTION IV IRRIG POUR BRL 0.9% SODIUM CHL 2F7124

## (undated) DEVICE — Device

## (undated) DEVICE — MHPB GEN MINOR PACK: Brand: MEDLINE INDUSTRIES, INC.

## (undated) DEVICE — STANDARD HYPODERMIC NEEDLE,POLYPROPYLENE HUB: Brand: MONOJECT

## (undated) DEVICE — TUBING ASPIR L12FT FOR LIPO SYS PSI-TEC

## (undated) DEVICE — SUTURE MCRYL SZ 4-0 L18IN ABSRB UD P-3 L13MM 3/8 CIR PRIM Y494G

## (undated) DEVICE — SUTURE MCRYL SZ 3 0 L18IN ABSRB UD PS 2 3 8 CIR REV CUT NDL MCP497G

## (undated) DEVICE — TUBING INFLTR L13FT CNTOUR GEN DISP FOR LIPO

## (undated) DEVICE — INTENDED FOR TISSUE SEPARATION, AND OTHER PROCEDURES THAT REQUIRE A SHARP SURGICAL BLADE TO PUNCTURE OR CUT.: Brand: BARD-PARKER ® CARBON RIB-BACK BLADES

## (undated) DEVICE — BINDER ABD UNISX 9IN 62IN L AND XL UNIV

## (undated) DEVICE — 3M™ STERI-STRIP™ REINFORCED ADHESIVE SKIN CLOSURES, R1547, 1/2 IN X 4 IN (12 MM X 100 MM), 6 STRIPS/ENVELOPE: Brand: 3M™ STERI-STRIP™

## (undated) DEVICE — SOLUTION SURG PREP ANTIMICROBIAL 4 OZ SKIN WND EXIDINE

## (undated) DEVICE — PREMIUM DRY TRAY LF: Brand: MEDLINE INDUSTRIES, INC.

## (undated) DEVICE — STERILE POLYISOPRENE POWDER-FREE SURGICAL GLOVES WITH EMOLLIENT COATING: Brand: PROTEXIS